# Patient Record
Sex: FEMALE | Race: WHITE | Employment: UNEMPLOYED | URBAN - METROPOLITAN AREA
[De-identification: names, ages, dates, MRNs, and addresses within clinical notes are randomized per-mention and may not be internally consistent; named-entity substitution may affect disease eponyms.]

---

## 2018-11-08 ENCOUNTER — HOSPITAL ENCOUNTER (EMERGENCY)
Age: 58
Discharge: HOME | End: 2018-11-08
Payer: COMMERCIAL

## 2018-11-08 VITALS
BODY MASS INDEX: 24.91 KG/M2 | SYSTOLIC BLOOD PRESSURE: 127 MMHG | OXYGEN SATURATION: 99 % | HEIGHT: 66 IN | HEART RATE: 72 BPM | RESPIRATION RATE: 14 BRPM | DIASTOLIC BLOOD PRESSURE: 74 MMHG | TEMPERATURE: 97.9 F | WEIGHT: 155 LBS

## 2018-11-08 DIAGNOSIS — G57.62 MORTON'S NEUROMA OF LEFT FOOT: Primary | ICD-10-CM

## 2018-11-08 PROCEDURE — 99282 EMERGENCY DEPT VISIT SF MDM: CPT

## 2018-11-08 ASSESSMENT — PAIN DESCRIPTION - PAIN TYPE: TYPE: ACUTE PAIN

## 2018-11-08 ASSESSMENT — PAIN DESCRIPTION - DESCRIPTORS: DESCRIPTORS: SHARP;SHOOTING

## 2018-11-08 ASSESSMENT — PAIN DESCRIPTION - LOCATION: LOCATION: LEG

## 2018-11-08 ASSESSMENT — PAIN DESCRIPTION - ORIENTATION: ORIENTATION: LEFT

## 2018-11-08 ASSESSMENT — PAIN DESCRIPTION - ONSET: ONSET: GRADUAL

## 2018-11-08 ASSESSMENT — PAIN DESCRIPTION - PROGRESSION: CLINICAL_PROGRESSION: GRADUALLY WORSENING

## 2018-11-08 ASSESSMENT — PAIN DESCRIPTION - FREQUENCY: FREQUENCY: CONTINUOUS

## 2018-11-08 ASSESSMENT — PAIN SCALES - GENERAL: PAINLEVEL_OUTOF10: 6

## 2018-11-08 NOTE — ED PROVIDER NOTES
---------------------------   The nursing notes within the ED encounter and vital signs as below have been reviewed. /74   Pulse 72   Temp 97.9 °F (36.6 °C) (Oral)   Resp 14   Ht 5' 6\" (1.676 m)   Wt 155 lb (70.3 kg)   SpO2 99%   BMI 25.02 kg/m²   Oxygen Saturation Interpretation: Normal      ---------------------------------------------------PHYSICAL EXAM--------------------------------------  Constitutional/General: Alert and oriented x3, well appearing, non toxic in NAD  Head: NC/AT  Eyes: PERRL, EOMI  Mouth: Airway patent, handling secretions, no trismus  Neck: Supple, full ROM, no meningeal signs, no lymphadenopathy. Pulmonary: Lungs clear to auscultation bilaterally, no wheezes, rales, or rhonchi. Not in respiratory distress  Cardiovascular:  Regular rate and rhythm, no murmurs, gallops, or rubs. 2+ distal pulses  Abdomen: Soft, non tender, non distended,  non-surgical abdomen. Extremities: Moves all extremities x 4. Warm and well perfused. Patient has full range of motion to the bilateral lower extremities. Patient has +2 DP pulses noted to the left foot. Patient's capillary refill is brisk, less than 3 seconds noted to all digits on the left foot. Patient is ambulatory with a stable gait. Has 5 out of 5 strength to the bilateral lower extremities. Patient has reproducible pain noted to the 3rd and 4th distal metatarsals on the plantar aspect of left foot. 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 No signs of infection. Skin: warm and dry without rash  Neurologic: GCS 15,  Psych: Normal Affect      ------------------------------ ED COURSE/MEDICAL DECISION MAKING----------------------  Medications - No data to display      Medical Decision Making:    Patient presents to the emergency department today for intermittent sharp stabbing pain to the plantar aspect of the left foot. This has been going on since June. Patient has had x-rays which were negative for acute fracture or dislocation.  She

## 2021-10-15 ENCOUNTER — OFFICE VISIT (OUTPATIENT)
Dept: FAMILY MEDICINE CLINIC | Age: 61
End: 2021-10-15
Payer: COMMERCIAL

## 2021-10-15 ENCOUNTER — TELEPHONE (OUTPATIENT)
Dept: FAMILY MEDICINE CLINIC | Age: 61
End: 2021-10-15

## 2021-10-15 VITALS
TEMPERATURE: 98.1 F | BODY MASS INDEX: 24.91 KG/M2 | HEIGHT: 66 IN | SYSTOLIC BLOOD PRESSURE: 124 MMHG | DIASTOLIC BLOOD PRESSURE: 68 MMHG | OXYGEN SATURATION: 99 % | HEART RATE: 83 BPM | WEIGHT: 155 LBS

## 2021-10-15 DIAGNOSIS — M54.2 NECK PAIN: ICD-10-CM

## 2021-10-15 DIAGNOSIS — J01.90 ACUTE SINUSITIS, RECURRENCE NOT SPECIFIED, UNSPECIFIED LOCATION: Primary | ICD-10-CM

## 2021-10-15 PROCEDURE — 99213 OFFICE O/P EST LOW 20 MIN: CPT | Performed by: INTERNAL MEDICINE

## 2021-10-15 RX ORDER — FLUTICASONE PROPIONATE 50 MCG
2 SPRAY, SUSPENSION (ML) NASAL DAILY
Qty: 16 G | Refills: 0 | Status: SHIPPED | OUTPATIENT
Start: 2021-10-15

## 2021-10-15 RX ORDER — DOXYCYCLINE HYCLATE 100 MG
100 TABLET ORAL 2 TIMES DAILY
Qty: 20 TABLET | Refills: 0 | Status: SHIPPED | OUTPATIENT
Start: 2021-10-15 | End: 2021-10-25

## 2021-10-15 SDOH — ECONOMIC STABILITY: FOOD INSECURITY: WITHIN THE PAST 12 MONTHS, THE FOOD YOU BOUGHT JUST DIDN'T LAST AND YOU DIDN'T HAVE MONEY TO GET MORE.: NEVER TRUE

## 2021-10-15 SDOH — ECONOMIC STABILITY: FOOD INSECURITY: WITHIN THE PAST 12 MONTHS, YOU WORRIED THAT YOUR FOOD WOULD RUN OUT BEFORE YOU GOT MONEY TO BUY MORE.: NEVER TRUE

## 2021-10-15 ASSESSMENT — ENCOUNTER SYMPTOMS
SORE THROAT: 0
SINUS PAIN: 0
NAUSEA: 0
DIARRHEA: 0
SINUS PRESSURE: 1
COUGH: 0
ABDOMINAL PAIN: 0
WHEEZING: 0
VOMITING: 0
EYES NEGATIVE: 1
CHEST TIGHTNESS: 0
SHORTNESS OF BREATH: 0

## 2021-10-15 ASSESSMENT — SOCIAL DETERMINANTS OF HEALTH (SDOH): HOW HARD IS IT FOR YOU TO PAY FOR THE VERY BASICS LIKE FOOD, HOUSING, MEDICAL CARE, AND HEATING?: NOT HARD AT ALL

## 2021-10-15 NOTE — PROGRESS NOTES
[address] [facility] [address] [facility]     10/15/21  Tameka Diaz Lower : 1960 Sex: female  Age: 63 y.o. Chief Complaint   Patient presents with    Otalgia     right ear, feels like she has something is in there    Sinus Problem     sinus drainage; was given an antibiotic and finished the last dose on monday    Other     pain in her back starts about middle and goes to the base of her skull       HPI    Patient presents to express care today with a couple complaints. States 3 weeks ago developed some sinus symptoms with drainage. Minimal cough. Feels like something might be in right ear. Also some neck pain which she has had off and on in the past but most recently has been worse. States she does have some minimal tingling occasionally down the arms right greater than left. There is no history of injury. She tells me no one else around her has been sick. She has had her first vaccine not the second yet. States she did see her PCP in the relatively recent past and he gave her what sounds like Augmentin and a steroid and states she did not get any better. Shows up here today for further evaluation. Denies any fever or chills. Denies nausea vomiting or diarrhea. Denies headache or myalgia. No loss of taste or smell. Denies shortness of breath. Review of Systems   Constitutional: Negative for chills and fever. HENT: Positive for postnasal drip and sinus pressure. Negative for congestion, ear pain, sinus pain and sore throat. Ear fullness and foreign body sensation to right ear   Eyes: Negative. Respiratory: Negative for cough, chest tightness, shortness of breath and wheezing. Cardiovascular: Negative for chest pain. Gastrointestinal: Negative for abdominal pain, diarrhea, nausea and vomiting. Musculoskeletal: Positive for neck pain. Negative for myalgias. Neurological: Negative for headaches. REST OF PERTINENT ROS GONE OVER AND WAS NEGATIVE.                Current Temp: 98.1 °F (36.7 °C)   TempSrc: Temporal   SpO2: 99%   Weight: 155 lb (70.3 kg)   Height: 5' 6\" (1.676 m)       Physical Exam  Vitals and nursing note reviewed. Constitutional:       General: She is not in acute distress. Appearance: She is well-developed. HENT:      Head: Normocephalic and atraumatic. Right Ear: Tympanic membrane, ear canal and external ear normal.      Left Ear: Tympanic membrane, ear canal and external ear normal.      Ears:      Comments: I see nothing in the right ear at this time     Nose: Nose normal.      Mouth/Throat:      Pharynx: No oropharyngeal exudate. Cardiovascular:      Rate and Rhythm: Normal rate and regular rhythm. Heart sounds: Normal heart sounds. No murmur heard. Pulmonary:      Effort: Pulmonary effort is normal. No respiratory distress. Breath sounds: Normal breath sounds. No wheezing, rhonchi or rales. Musculoskeletal:         General: Tenderness present. Cervical back: Normal range of motion and neck supple. Comments: Minimal discomfort to palpation along the lower cervical spine area. No gross deformity noted. No redness warmth or swelling noted. No pain along the thoracic spine area. Lymphadenopathy:      Cervical: No cervical adenopathy. Skin:     General: Skin is warm and dry. Neurological:      Mental Status: She is alert and oriented to person, place, and time. Sensory: No sensory deficit. Motor: No weakness. Psychiatric:         Mood and Affect: Mood normal.         Behavior: Behavior normal.         Thought Content: Thought content normal.         Judgment: Judgment normal.               Assessment and Plan:  Reyna Camacho was seen today for otalgia, sinus problem and other. Diagnoses and all orders for this visit:    Acute sinusitis, recurrence not specified, unspecified location    Neck pain  -     XR CERVICAL SPINE (2-3 VIEWS);  Future    Other orders  -     doxycycline hyclate (VIBRA-TABS) 100 MG tablet; Take 1 tablet by mouth 2 times daily for 10 days  -     fluticasone (FLONASE) 50 MCG/ACT nasal spray; 2 sprays by Each Nostril route daily    Plan: I will start doxycycline and fluticasone nasal spray. Warned of potential side effects. Probiotic. Push fluids. X-ray of cervical spine. Stress she is to follow-up again with her PCP. May need further imaging of her neck if the tingling sensation persist or the pain does not resolve. She did declined Covid testing at this time. Notify us if not improving. Return for u pcp. Seen By:  Luca Chong MD      *Document was created using voice recognition software. Note was reviewed however may contain grammatical errors.

## 2021-10-21 NOTE — TELEPHONE ENCOUNTER
Patient was notified and gave a verbal understanding. Results sent to Dr. Roberto Carlos Callejas office.

## 2022-05-27 ENCOUNTER — OFFICE VISIT (OUTPATIENT)
Dept: FAMILY MEDICINE CLINIC | Age: 62
End: 2022-05-27
Payer: COMMERCIAL

## 2022-05-27 VITALS
TEMPERATURE: 97.6 F | HEIGHT: 66 IN | SYSTOLIC BLOOD PRESSURE: 120 MMHG | HEART RATE: 83 BPM | DIASTOLIC BLOOD PRESSURE: 66 MMHG | BODY MASS INDEX: 24.91 KG/M2 | RESPIRATION RATE: 18 BRPM | OXYGEN SATURATION: 95 % | WEIGHT: 155 LBS

## 2022-05-27 DIAGNOSIS — H60.391 OTHER INFECTIVE ACUTE OTITIS EXTERNA OF RIGHT EAR: Primary | ICD-10-CM

## 2022-05-27 PROCEDURE — 99213 OFFICE O/P EST LOW 20 MIN: CPT | Performed by: PHYSICIAN ASSISTANT

## 2022-05-27 NOTE — PROGRESS NOTES
Chief Complaint       Otalgia (R)      History of Present Illness   Source of history provided by:  patient. Ralph Chapin is a 63 y.o. old female presenting to the walk in clinic for evaluation of right ear fullness, pressure, and decreased hearing which has been present for the past week. Denies associated nasal congestion, rhinorrhea, or sore throat. Reports associated crusting of the external ear and occasional drainage from the ear canal.  Denies any fever, chills, CP, SOB, abdominal pain, neck stiffness, rash, or lethargy. ROS    Unless otherwise stated in this report or unable to obtain because of the patient's clinical or mental status as evidenced by the medical record, this patients's positive and negative responses for Review of Systems, constitutional, psych, eyes, ENT, cardiovascular, respiratory, gastrointestinal, neurological, genitourinary, musculoskeletal, integument systems and systems related to the presenting problem are either stated in the preceding or were not pertinent or were negative for the symptoms and/or complaints related to the medical problem. Past Medical History:  has no past medical history on file. Past Surgical History:  has no past surgical history on file. Social History:  reports that she has been smoking cigarettes. She has been smoking about 1.00 pack per day. She has never used smokeless tobacco. She reports that she does not drink alcohol and does not use drugs. Family History: family history is not on file. Allergies: Patient has no known allergies. Physical Exam         VS:  /66   Pulse 83   Temp 97.6 °F (36.4 °C) (Temporal)   Resp 18   Ht 5' 6\" (1.676 m)   Wt 155 lb (70.3 kg)   SpO2 95%   BMI 25.02 kg/m²    Oxygen Saturation Interpretation: Normal.    Constitutional:  Alert, development consistent with age. Ears:  External Ears: Normal pinna bilaterally.                  TM's & External Canals: Left TM and canal within normal limits. Right TM not visible due to moderate swelling of the right external canal.  Mild tenderness with movement of the right pinna. There is crusting overlying the external ear as well as a small amount of purulent drainage noted from the right canal.  Nose: No congestion of the nasal mucosa. Throat:  Posterior pharynx without injection, exudate, or tonsillar hypertrophy. Airway patient. Neck:  Normal ROM. Supple. No adenopathy. Respiratory:  CTAB without wheezing, rales, or rhonchi  CV: Regular rate and rhythm, normal heart sounds, without pathological murmurs, ectopy, gallops, or rubs. Skin:  Moist and warm without rashes or lesions. Lymphatic: No lymphangitis or adenopathy noted. Neurological:  Oriented. Motor functions intact. Lab / Imaging Results   (All laboratory and radiology results have been personally reviewed by myself)  Labs:  No results found for this visit on 05/27/22. Assessment / Plan     Impression(s):  Rachael Mancia was seen today for otalgia. Diagnoses and all orders for this visit:    Other infective acute otitis externa of right ear  -     neomycin-polymyxin-hydrocortisone (CORTISPORIN) 3.5-19257-9 otic solution; 4 drops R ear QID x 7 days      Disposition:  Disposition: Discharge to home. Script written for Cortisporin otic drops, side effects and application directions discussed. Advised to keep ear canal clean and dry to prevent exacerbation. Additional symptomatic relief discussed. F/u PCP in 5-7 days if symptoms persist. ED sooner if symptoms worsen or change. ED immediately with fever, severe/worsening ear pain, mastoid redness/tenderness, neck stiffness, CP, dyspnea, or dysphagia. Pt is in agreement with this care plan. All questions answered. Clover Wise PA-C    **This report was transcribed using voice recognition software. Every effort was made to ensure accuracy; however, inadvertent computerized transcription errors may be present.

## 2023-05-25 ENCOUNTER — OFFICE VISIT (OUTPATIENT)
Dept: FAMILY MEDICINE CLINIC | Age: 63
End: 2023-05-25
Payer: COMMERCIAL

## 2023-05-25 VITALS
DIASTOLIC BLOOD PRESSURE: 68 MMHG | TEMPERATURE: 98 F | WEIGHT: 145 LBS | OXYGEN SATURATION: 99 % | RESPIRATION RATE: 20 BRPM | SYSTOLIC BLOOD PRESSURE: 126 MMHG | HEART RATE: 77 BPM | HEIGHT: 66 IN | BODY MASS INDEX: 23.3 KG/M2

## 2023-05-25 DIAGNOSIS — H60.541 DERMATITIS OF EAR CANAL, RIGHT: Primary | ICD-10-CM

## 2023-05-25 DIAGNOSIS — H69.81 ETD (EUSTACHIAN TUBE DYSFUNCTION), RIGHT: ICD-10-CM

## 2023-05-25 PROCEDURE — 99213 OFFICE O/P EST LOW 20 MIN: CPT | Performed by: NURSE PRACTITIONER

## 2023-05-25 RX ORDER — TRIAMCINOLONE ACETONIDE 0.25 MG/G
CREAM TOPICAL
Qty: 15 G | Refills: 0 | Status: SHIPPED | OUTPATIENT
Start: 2023-05-25

## 2023-05-25 RX ORDER — CETIRIZINE HYDROCHLORIDE, PSEUDOEPHEDRINE HYDROCHLORIDE 5; 120 MG/1; MG/1
1 TABLET, FILM COATED, EXTENDED RELEASE ORAL 2 TIMES DAILY
Qty: 60 TABLET | Refills: 0 | Status: SHIPPED | OUTPATIENT
Start: 2023-05-25

## 2023-05-25 NOTE — PROGRESS NOTES
Chief Complaint:   Otalgia      History of Present Illness   Source of history provided by:  patient. Robert Arrington is a 66 y.o. old female presenting to the [facility] care for evaluation of plugged sensation in the right ear and itching  x  several  days. Denies any associated nasal congestion, rhinorrhea, and sore throat. Denies any discharge from the ear canal. Has tried taking nothing OTC without relief. Denies any fever, chills, CP, SOB, abdominal pain, neck stiffness, rash, or lethargy. Review of Systems   Unless otherwise stated in this report or unable to obtain because of the patient's clinical or mental status as evidenced by the medical record, this patients's positive and negative responses for Review of Systems, constitutional, psych, eyes, ENT, cardiovascular, respiratory, gastrointestinal, neurological, genitourinary, musculoskeletal, integument systems and systems related to the presenting problem are either stated in the preceding or were not pertinent or were negative for the symptoms and/or complaints related to the medical problem. Past Medical History:  has no past medical history on file. Past Surgical History:  has no past surgical history on file. Social History:  reports that she has been smoking cigarettes. She has been smoking an average of 1 pack per day. She has never used smokeless tobacco. She reports that she does not drink alcohol and does not use drugs. Family History: family history is not on file. Allergies: Patient has no known allergies. Physical Exam   Vital Signs:   Vitals:    05/25/23 0926   BP: 126/68   Pulse: 77   Resp: 20   Temp: 98 °F (36.7 °C)   TempSrc: Temporal   SpO2: 99%   Weight: 145 lb (65.8 kg)   Height: 5' 6\" (1.676 m)     Oxygen Saturation Interpretation: Normal.    Constitutional:  Alert, development consistent with age. Ears:  External Ears: Normal pinna bilaterally.                  TM's & External Canals: Erythema and flaking of the

## 2023-06-19 ENCOUNTER — OFFICE VISIT (OUTPATIENT)
Dept: FAMILY MEDICINE CLINIC | Age: 63
End: 2023-06-19
Payer: COMMERCIAL

## 2023-06-19 VITALS
RESPIRATION RATE: 20 BRPM | SYSTOLIC BLOOD PRESSURE: 134 MMHG | OXYGEN SATURATION: 99 % | HEIGHT: 66 IN | BODY MASS INDEX: 23.3 KG/M2 | TEMPERATURE: 98 F | DIASTOLIC BLOOD PRESSURE: 76 MMHG | WEIGHT: 145 LBS | HEART RATE: 76 BPM

## 2023-06-19 DIAGNOSIS — K12.1 DENTURE SORE MOUTH: ICD-10-CM

## 2023-06-19 DIAGNOSIS — H69.83 DYSFUNCTION OF BOTH EUSTACHIAN TUBES: Primary | ICD-10-CM

## 2023-06-19 PROCEDURE — 99213 OFFICE O/P EST LOW 20 MIN: CPT

## 2023-06-19 RX ORDER — PREDNISONE 10 MG/1
TABLET ORAL
Qty: 18 TABLET | Refills: 0 | Status: SHIPPED | OUTPATIENT
Start: 2023-06-19 | End: 2023-06-28

## 2023-06-19 RX ORDER — SUMATRIPTAN 25 MG/1
TABLET, FILM COATED ORAL
COMMUNITY
Start: 2022-01-13

## 2023-06-19 RX ORDER — AMOXICILLIN 500 MG/1
500 CAPSULE ORAL 2 TIMES DAILY
Qty: 14 CAPSULE | Refills: 0 | Status: SHIPPED | OUTPATIENT
Start: 2023-06-19 | End: 2023-06-26

## 2023-06-19 NOTE — PROGRESS NOTES
Chief Complaint:   Otalgia      History of Present Illness   Source of history provided by:  patient. Shaji Maloney is a 66 y.o. old female presenting to the [facility] for evaluation of bilateral ear pain x  several  days. Reports associated nasal congestion, rhinorrhea, and sore throat. Denies any discharge from the ear canal. Has tried taking Zyrtec-D without relief. Denies any fever, chills, CP, SOB, abdominal pain, neck stiffness, rash, or lethargy. She is also having swelling and pain to the lower front jaw due to her dentures. Review of Systems   Unless otherwise stated in this report or unable to obtain because of the patient's clinical or mental status as evidenced by the medical record, this patients's positive and negative responses for Review of Systems, constitutional, psych, eyes, ENT, cardiovascular, respiratory, gastrointestinal, neurological, genitourinary, musculoskeletal, integument systems and systems related to the presenting problem are either stated in the preceding or were not pertinent or were negative for the symptoms and/or complaints related to the medical problem. Past Medical History:  has no past medical history on file. Past Surgical History:  has no past surgical history on file. Social History:  reports that she has been smoking cigarettes. She has been smoking an average of 1 pack per day. She has never used smokeless tobacco. She reports that she does not drink alcohol and does not use drugs. Family History: family history is not on file. Allergies: Patient has no known allergies. Physical Exam   Vital Signs:   Vitals:    06/19/23 0934   BP: 134/76   Pulse: 76   Resp: 20   Temp: 98 °F (36.7 °C)   TempSrc: Temporal   SpO2: 99%   Weight: 145 lb (65.8 kg)   Height: 5' 6\" (1.676 m)     Oxygen Saturation Interpretation: Normal.    Constitutional:  Alert, development consistent with age. Ears:  External Ears: Normal pinna bilaterally.                  TM's &

## 2024-02-15 ENCOUNTER — OFFICE VISIT (OUTPATIENT)
Dept: FAMILY MEDICINE CLINIC | Age: 64
End: 2024-02-15
Payer: COMMERCIAL

## 2024-02-15 VITALS
RESPIRATION RATE: 18 BRPM | DIASTOLIC BLOOD PRESSURE: 76 MMHG | HEART RATE: 92 BPM | BODY MASS INDEX: 23.3 KG/M2 | TEMPERATURE: 98.3 F | OXYGEN SATURATION: 97 % | WEIGHT: 145 LBS | HEIGHT: 66 IN | SYSTOLIC BLOOD PRESSURE: 132 MMHG

## 2024-02-15 DIAGNOSIS — J01.90 ACUTE BACTERIAL SINUSITIS: Primary | ICD-10-CM

## 2024-02-15 DIAGNOSIS — B96.89 ACUTE BACTERIAL SINUSITIS: Primary | ICD-10-CM

## 2024-02-15 PROCEDURE — 99213 OFFICE O/P EST LOW 20 MIN: CPT | Performed by: PHYSICIAN ASSISTANT

## 2024-02-15 RX ORDER — AMOXICILLIN 875 MG/1
875 TABLET, COATED ORAL 2 TIMES DAILY
Qty: 20 TABLET | Refills: 0 | Status: SHIPPED | OUTPATIENT
Start: 2024-02-15 | End: 2024-02-25

## 2024-02-15 RX ORDER — METHYLPREDNISOLONE 4 MG/1
TABLET ORAL
Qty: 1 KIT | Refills: 0 | Status: SHIPPED | OUTPATIENT
Start: 2024-02-15 | End: 2024-02-21

## 2024-02-15 RX ORDER — AMOXICILLIN AND CLAVULANATE POTASSIUM 875; 125 MG/1; MG/1
1 TABLET, FILM COATED ORAL 2 TIMES DAILY
Qty: 20 TABLET | Refills: 0 | Status: CANCELLED | OUTPATIENT
Start: 2024-02-15 | End: 2024-02-25

## 2024-02-15 NOTE — PROGRESS NOTES
Chief Complaint       Cough, Congestion, and Sinusitis (X 2 weeks)      History of Present Illness   Source of history provided by: Patient.      Estella Lee is a 64 y.o. old female presenting to the walk in clinic for evaluation of sinus pressure, nasal congestion, discolored nasal drainage, mild productive cough, and sore throat x 14 days. Has been taking Advil Cold and Flu OTC without relief. Denies any fever, chills, wheezing, CP, SOB, or GI symptoms.  Denies any hx of asthma or COPD. Admits to daily tobacco use.  Denies any contact with any individuals with known COVID-19 infection or under investigation for COVID-19 infection.    ROS    Unless otherwise stated in this report or unable to obtain because of the patient's clinical or mental status as evidenced by the medical record, this patients's positive and negative responses for Review of Systems, constitutional, psych, eyes, ENT, cardiovascular, respiratory, gastrointestinal, neurological, genitourinary, musculoskeletal, integument systems and systems related to the presenting problem are either stated in the preceding or were not pertinent or were negative for the symptoms and/or complaints related to the medical problem.    Past Medical History:  has no past medical history on file.  Past Surgical History:  has no past surgical history on file.  Social History:  reports that she has been smoking cigarettes. She has never used smokeless tobacco. She reports that she does not drink alcohol and does not use drugs.  Family History: family history is not on file.   Allergies: Patient has no known allergies.    Physical Exam         VS:  /76   Pulse 92   Temp 98.3 °F (36.8 °C) (Temporal)   Resp 18   Ht 1.676 m (5' 6\")   Wt 65.8 kg (145 lb)   SpO2 97%   BMI 23.40 kg/m²    Oxygen Saturation Interpretation: Normal.    Constitutional:  Alert, development consistent with age.  Head: Moderate TTP over the frontal and maxillary sinuses

## 2024-07-08 ENCOUNTER — OFFICE VISIT (OUTPATIENT)
Dept: FAMILY MEDICINE CLINIC | Age: 64
End: 2024-07-08
Payer: COMMERCIAL

## 2024-07-08 VITALS
WEIGHT: 150 LBS | HEART RATE: 83 BPM | DIASTOLIC BLOOD PRESSURE: 60 MMHG | TEMPERATURE: 97.5 F | HEIGHT: 66 IN | BODY MASS INDEX: 24.11 KG/M2 | SYSTOLIC BLOOD PRESSURE: 122 MMHG | OXYGEN SATURATION: 98 % | RESPIRATION RATE: 18 BRPM

## 2024-07-08 DIAGNOSIS — J01.00 ACUTE NON-RECURRENT MAXILLARY SINUSITIS: Primary | ICD-10-CM

## 2024-07-08 DIAGNOSIS — H69.91 ETD (EUSTACHIAN TUBE DYSFUNCTION), RIGHT: ICD-10-CM

## 2024-07-08 PROCEDURE — 99213 OFFICE O/P EST LOW 20 MIN: CPT | Performed by: NURSE PRACTITIONER

## 2024-07-08 RX ORDER — AMOXICILLIN 875 MG/1
875 TABLET, COATED ORAL 2 TIMES DAILY
Qty: 14 TABLET | Refills: 0 | Status: SHIPPED | OUTPATIENT
Start: 2024-07-08 | End: 2024-07-15

## 2024-07-08 RX ORDER — METHYLPREDNISOLONE 4 MG/1
TABLET ORAL
Qty: 1 KIT | Refills: 0 | Status: SHIPPED | OUTPATIENT
Start: 2024-07-08

## 2024-07-08 NOTE — PROGRESS NOTES
Chief Complaint   Ear Fullness      HPI   Source of history provided by: patient.      Estella Lee is a 64 y.o. old female who presents to walk-in care for evaluation of right ear pain X 14 days. Associated symptoms include sinus pressure, nasal congestion, and rhinorrhea.  Since onset symptoms have been about the same. Has tried netipot at home with some symptomatic relief. Denies fever, chills, headache, sore throat, cough, wheezing, chest congestion, chest pain, shortness of breath, abdominal discomfort, nausea, vomiting, body aches, and malaise. Pertinent PMH of: PMHpositive: no significant PMH.  The patient is a current everyday smoker.    ROS   Pertinent positives and negatives are stated within HPI, all other systems reviewed and are negative.  Past Medical History:  has no past medical history on file.  Surgical History:  has no past surgical history on file.  Social History:  reports that she has been smoking cigarettes. She has never used smokeless tobacco. She reports that she does not drink alcohol and does not use drugs.  Family History: family history is not on file.  Allergies: Patient has no known allergies.    Physical Exam      VS:  /60   Pulse 83   Temp 97.5 °F (36.4 °C)   Resp 18   Ht 1.676 m (5' 6\")   Wt 68 kg (150 lb)   SpO2 98%   BMI 24.21 kg/m²    Oxygen Saturation Interpretation: Normal.    Physical Exam  Vitals and nursing note reviewed.   Constitutional:       General: She is not in acute distress.     Appearance: Normal appearance. She is not ill-appearing.   HENT:      Head: Normocephalic and atraumatic.      Right Ear: External ear normal. A middle ear effusion is present.      Left Ear: External ear normal.      Nose: Congestion and rhinorrhea present.      Right Sinus: Maxillary sinus tenderness present.      Left Sinus: Maxillary sinus tenderness present.      Mouth/Throat:      Comments: Clear post nasal drip  Eyes:      Conjunctiva/sclera: Conjunctivae

## 2025-04-29 ENCOUNTER — OFFICE VISIT (OUTPATIENT)
Dept: FAMILY MEDICINE CLINIC | Age: 65
End: 2025-04-29
Payer: COMMERCIAL

## 2025-04-29 VITALS
BODY MASS INDEX: 24.91 KG/M2 | OXYGEN SATURATION: 94 % | HEIGHT: 66 IN | HEART RATE: 95 BPM | WEIGHT: 155 LBS | TEMPERATURE: 97.5 F | RESPIRATION RATE: 18 BRPM | SYSTOLIC BLOOD PRESSURE: 110 MMHG | DIASTOLIC BLOOD PRESSURE: 60 MMHG

## 2025-04-29 DIAGNOSIS — H69.93 ETD (EUSTACHIAN TUBE DYSFUNCTION), BILATERAL: Primary | ICD-10-CM

## 2025-04-29 PROCEDURE — 99213 OFFICE O/P EST LOW 20 MIN: CPT

## 2025-04-29 RX ORDER — METHYLPREDNISOLONE 4 MG/1
TABLET ORAL
Qty: 1 KIT | Refills: 0 | Status: SHIPPED | OUTPATIENT
Start: 2025-04-29

## 2025-04-29 NOTE — PROGRESS NOTES
Estella Lee (:  1960) is a 65 y.o. female, here for evaluation of the following chief complaint(s):    History of Present Illness  The patient presents for evaluation of bilateral ear fullness.    A sensation of fullness in the ears is reported. These symptoms have been present for approximately one month and have remained consistent in severity. Attempts to alleviate these symptoms with a Debi pot and Flonase were made, but Flonase was discontinued a few days ago due to lack of improvement. No allergy medications are taken. Clear nasal discharge are reported. The patient is unable to equalize the pressure in the ears. Similar symptoms were experienced in  or July of the previous year. There is no known history of cardiac issues or diabetes.    Review of Systems - negative except as listed above in the HPI    Physical Exam         VS:  /60   Pulse 95   Temp 97.5 °F (36.4 °C) (Temporal)   Resp 18   Ht 1.676 m (5' 6\")   Wt 70.3 kg (155 lb)   SpO2 94%   BMI 25.02 kg/m²    Oxygen Saturation Interpretation: Normal.    Constitutional:  Alert, development consistent with age.  Ears:  External Ears: Bilateral pinna normal.  Bilateral ear canals have clear middle ear effusions.  There is no erythema, bulging or perforation of TM.  Canals normal bilaterally without swelling or exudate  Nose:  negative for congestion of the nasal mucosa. There is no injection to middle turbinates bilaterally. Clear rhinorrhea.    Throat: negative for posterior pharyngeal erythema. No exudate or tonsillar hypertrophy noted.    Neck:  Supple. There is no anterior cervical adenopathy.  Lungs: CTAB without wheezes, rales, or rhonchi  Heart:  Regular rate and rhythm, normal heart sounds, without pathological murmurs, ectopy, gallops, or rubs.  Skin:  Normal turgor.  Warm, dry, without visible rash.  Neurological:  Alert and oriented.  Motor functions intact.  Responds to verbal commands.     Lab / Imaging Results